# Patient Record
Sex: MALE | Race: OTHER
[De-identification: names, ages, dates, MRNs, and addresses within clinical notes are randomized per-mention and may not be internally consistent; named-entity substitution may affect disease eponyms.]

---

## 2023-03-18 ENCOUNTER — HOSPITAL ENCOUNTER (INPATIENT)
Dept: HOSPITAL 54 - ER | Age: 73
LOS: 4 days | Discharge: NURSING, REHAB OR LONG-TERM CARE | DRG: 291 | End: 2023-03-22
Attending: NURSE PRACTITIONER | Admitting: NURSE PRACTITIONER
Payer: MEDICARE

## 2023-03-18 VITALS — DIASTOLIC BLOOD PRESSURE: 70 MMHG | SYSTOLIC BLOOD PRESSURE: 110 MMHG

## 2023-03-18 VITALS — WEIGHT: 240 LBS | BODY MASS INDEX: 29.22 KG/M2 | HEIGHT: 76 IN

## 2023-03-18 DIAGNOSIS — L89.224: ICD-10-CM

## 2023-03-18 DIAGNOSIS — L89.610: ICD-10-CM

## 2023-03-18 DIAGNOSIS — D50.9: ICD-10-CM

## 2023-03-18 DIAGNOSIS — J96.01: ICD-10-CM

## 2023-03-18 DIAGNOSIS — R60.9: ICD-10-CM

## 2023-03-18 DIAGNOSIS — L89.126: ICD-10-CM

## 2023-03-18 DIAGNOSIS — L89.623: ICD-10-CM

## 2023-03-18 DIAGNOSIS — M16.12: ICD-10-CM

## 2023-03-18 DIAGNOSIS — Z88.8: ICD-10-CM

## 2023-03-18 DIAGNOSIS — D64.9: ICD-10-CM

## 2023-03-18 DIAGNOSIS — N40.0: ICD-10-CM

## 2023-03-18 DIAGNOSIS — M17.12: ICD-10-CM

## 2023-03-18 DIAGNOSIS — I48.92: ICD-10-CM

## 2023-03-18 DIAGNOSIS — I11.0: Primary | ICD-10-CM

## 2023-03-18 DIAGNOSIS — Z86.59: ICD-10-CM

## 2023-03-18 DIAGNOSIS — M24.561: ICD-10-CM

## 2023-03-18 DIAGNOSIS — Z79.01: ICD-10-CM

## 2023-03-18 DIAGNOSIS — Z91.199: ICD-10-CM

## 2023-03-18 DIAGNOSIS — L89.893: ICD-10-CM

## 2023-03-18 DIAGNOSIS — R53.2: ICD-10-CM

## 2023-03-18 DIAGNOSIS — E11.40: ICD-10-CM

## 2023-03-18 DIAGNOSIS — I50.23: ICD-10-CM

## 2023-03-18 DIAGNOSIS — M24.562: ICD-10-CM

## 2023-03-18 DIAGNOSIS — J90: ICD-10-CM

## 2023-03-18 DIAGNOSIS — I48.20: ICD-10-CM

## 2023-03-18 DIAGNOSIS — E44.0: ICD-10-CM

## 2023-03-18 DIAGNOSIS — Z20.822: ICD-10-CM

## 2023-03-18 DIAGNOSIS — I07.1: ICD-10-CM

## 2023-03-18 DIAGNOSIS — L89.154: ICD-10-CM

## 2023-03-18 DIAGNOSIS — L89.144: ICD-10-CM

## 2023-03-18 DIAGNOSIS — E88.09: ICD-10-CM

## 2023-03-18 DIAGNOSIS — E78.5: ICD-10-CM

## 2023-03-18 DIAGNOSIS — Z79.899: ICD-10-CM

## 2023-03-18 DIAGNOSIS — G89.29: ICD-10-CM

## 2023-03-18 LAB
ALBUMIN SERPL BCP-MCNC: 2.2 G/DL (ref 3.4–5)
ALP SERPL-CCNC: 117 U/L (ref 46–116)
ALT SERPL W P-5'-P-CCNC: 31 U/L (ref 12–78)
AST SERPL W P-5'-P-CCNC: 18 U/L (ref 15–37)
BASOPHILS # BLD AUTO: 0 K/UL (ref 0–0.2)
BASOPHILS NFR BLD AUTO: 0.3 % (ref 0–2)
BILIRUB DIRECT SERPL-MCNC: 0.2 MG/DL (ref 0–0.2)
BILIRUB SERPL-MCNC: 0.5 MG/DL (ref 0.2–1)
BUN SERPL-MCNC: 14 MG/DL (ref 7–18)
CALCIUM SERPL-MCNC: 8 MG/DL (ref 8.5–10.1)
CHLORIDE SERPL-SCNC: 107 MMOL/L (ref 98–107)
CO2 SERPL-SCNC: 31 MMOL/L (ref 21–32)
CREAT SERPL-MCNC: 0.7 MG/DL (ref 0.6–1.3)
EOSINOPHIL NFR BLD AUTO: 1.3 % (ref 0–6)
GLUCOSE SERPL-MCNC: 105 MG/DL (ref 74–106)
HCT VFR BLD AUTO: 35 % (ref 39–51)
HGB BLD-MCNC: 10.9 G/DL (ref 13.5–17.5)
LYMPHOCYTES NFR BLD AUTO: 1.1 K/UL (ref 0.8–4.8)
LYMPHOCYTES NFR BLD AUTO: 18.8 % (ref 20–44)
MCHC RBC AUTO-ENTMCNC: 31 G/DL (ref 31–36)
MCV RBC AUTO: 76 FL (ref 80–96)
MONOCYTES NFR BLD AUTO: 0.5 K/UL (ref 0.1–1.3)
MONOCYTES NFR BLD AUTO: 8.5 % (ref 2–12)
NEUTROPHILS # BLD AUTO: 4.3 K/UL (ref 1.8–8.9)
NEUTROPHILS NFR BLD AUTO: 71.1 % (ref 43–81)
PLATELET # BLD AUTO: 288 K/UL (ref 150–450)
POTASSIUM SERPL-SCNC: 4 MMOL/L (ref 3.5–5.1)
PROT SERPL-MCNC: 5.7 G/DL (ref 6.4–8.2)
RBC # BLD AUTO: 4.63 MIL/UL (ref 4.5–6)
SODIUM SERPL-SCNC: 143 MMOL/L (ref 136–145)
WBC NRBC COR # BLD AUTO: 6 K/UL (ref 4.3–11)

## 2023-03-18 PROCEDURE — A6253 ABSORPT DRG > 48 SQ IN W/O B: HCPCS

## 2023-03-18 PROCEDURE — A4223 INFUSION SUPPLIES W/O PUMP: HCPCS

## 2023-03-18 PROCEDURE — A4349 DISPOSABLE MALE EXTERNAL CAT: HCPCS

## 2023-03-18 PROCEDURE — G0378 HOSPITAL OBSERVATION PER HR: HCPCS

## 2023-03-18 PROCEDURE — C9803 HOPD COVID-19 SPEC COLLECT: HCPCS

## 2023-03-18 NOTE — NUR
INSERTED ANGO CATHETER G 18 ON RT FOR ARM BLOOD DROW AND SENT TO LAB AND BLOOD 
CULTURE X 2 SENT ANS LACTIC ACID SENT TO LAB

## 2023-03-18 NOTE — NUR
TELE RN ADMISSION NOTES:



RECEIVED PATIENT AWAKE IN BED, TRANSFER VIA GURNEY FROM ER, PATIENT WAS PLACED COMFORTABLY 
IN BED, BED IN LOW POSITION, CALL LIGHTS WITHIN REACH, NO COMPLAIN OF PAIN AND DISCOMFORT AT 
THIS TIME, ON O2 INHALATION AT 2LPM SATURATING AT 98-99%, PATIENT ON TELE MONITOR- SR-[id number] 
NO SYMPTOMS WAS OBSERVED, SKIN ASSESSMENT PARTIALLY DONE SOME PICTURES TAKEN AS PATIENT 
REFUSED TO BE TURNED AND REMOVAL OF BANDAGE ON LEFT FOOT, INVENTORY DONE AND SIGNED, PATIENT 
WAS ORIENTED TO ROOM REMIND TO USE THE CALL LIGHTS WHEN NEEDED ASSISTANCE, PATIENT KEPT 
CLEAN AND DRY ALL NEEDS MET WILL CONTINUE TO MONITOR

## 2023-03-19 VITALS — SYSTOLIC BLOOD PRESSURE: 124 MMHG | DIASTOLIC BLOOD PRESSURE: 69 MMHG

## 2023-03-19 VITALS — DIASTOLIC BLOOD PRESSURE: 65 MMHG | SYSTOLIC BLOOD PRESSURE: 99 MMHG

## 2023-03-19 VITALS — SYSTOLIC BLOOD PRESSURE: 115 MMHG | DIASTOLIC BLOOD PRESSURE: 89 MMHG

## 2023-03-19 VITALS — DIASTOLIC BLOOD PRESSURE: 69 MMHG | SYSTOLIC BLOOD PRESSURE: 110 MMHG

## 2023-03-19 VITALS — SYSTOLIC BLOOD PRESSURE: 108 MMHG | DIASTOLIC BLOOD PRESSURE: 59 MMHG

## 2023-03-19 VITALS — DIASTOLIC BLOOD PRESSURE: 64 MMHG | SYSTOLIC BLOOD PRESSURE: 124 MMHG

## 2023-03-19 VITALS — DIASTOLIC BLOOD PRESSURE: 66 MMHG | SYSTOLIC BLOOD PRESSURE: 120 MMHG

## 2023-03-19 LAB
BASOPHILS # BLD AUTO: 0 K/UL (ref 0–0.2)
BASOPHILS NFR BLD AUTO: 0.3 % (ref 0–2)
BUN SERPL-MCNC: 12 MG/DL (ref 7–18)
CALCIUM SERPL-MCNC: 8.3 MG/DL (ref 8.5–10.1)
CHLORIDE SERPL-SCNC: 102 MMOL/L (ref 98–107)
CHOLEST SERPL-MCNC: 106 MG/DL (ref ?–200)
CO2 SERPL-SCNC: 33 MMOL/L (ref 21–32)
CREAT SERPL-MCNC: 0.8 MG/DL (ref 0.6–1.3)
EOSINOPHIL NFR BLD AUTO: 1.1 % (ref 0–6)
GLUCOSE SERPL-MCNC: 113 MG/DL (ref 74–106)
HCT VFR BLD AUTO: 39 % (ref 39–51)
HDLC SERPL-MCNC: 47 MG/DL (ref 40–60)
HGB BLD-MCNC: 11.6 G/DL (ref 13.5–17.5)
IRON SERPL-MCNC: 23 UG/DL (ref 50–175)
LDLC SERPL DIRECT ASSAY-MCNC: 59 MG/DL (ref 0–99)
LYMPHOCYTES NFR BLD AUTO: 1 K/UL (ref 0.8–4.8)
LYMPHOCYTES NFR BLD AUTO: 19.4 % (ref 20–44)
MAGNESIUM SERPL-MCNC: 1.9 MG/DL (ref 1.8–2.4)
MCHC RBC AUTO-ENTMCNC: 30 G/DL (ref 31–36)
MCV RBC AUTO: 75 FL (ref 80–96)
MONOCYTES NFR BLD AUTO: 0.4 K/UL (ref 0.1–1.3)
MONOCYTES NFR BLD AUTO: 7.9 % (ref 2–12)
NEUTROPHILS # BLD AUTO: 3.6 K/UL (ref 1.8–8.9)
NEUTROPHILS NFR BLD AUTO: 71.3 % (ref 43–81)
PHOSPHATE SERPL-MCNC: 3.6 MG/DL (ref 2.5–4.9)
PLATELET # BLD AUTO: 304 K/UL (ref 150–450)
POTASSIUM SERPL-SCNC: 3.5 MMOL/L (ref 3.5–5.1)
RBC # BLD AUTO: 5.12 MIL/UL (ref 4.5–6)
SODIUM SERPL-SCNC: 140 MMOL/L (ref 136–145)
TIBC SERPL-MCNC: 276 UG/DL (ref 250–450)
TRIGL SERPL-MCNC: 61 MG/DL (ref 30–150)
TSH SERPL DL<=0.005 MIU/L-ACNC: 2.62 UIU/ML (ref 0.36–3.74)
WBC NRBC COR # BLD AUTO: 5.1 K/UL (ref 4.3–11)

## 2023-03-19 RX ADMIN — PANTOPRAZOLE SODIUM SCH MG: 40 TABLET, DELAYED RELEASE ORAL at 07:44

## 2023-03-19 RX ADMIN — Medication SCH TAB: at 08:19

## 2023-03-19 RX ADMIN — GABAPENTIN SCH MG: 100 CAPSULE ORAL at 12:29

## 2023-03-19 RX ADMIN — TAMSULOSIN HYDROCHLORIDE SCH MG: 0.4 CAPSULE ORAL at 08:20

## 2023-03-19 RX ADMIN — Medication SCH EACH: at 17:00

## 2023-03-19 RX ADMIN — APIXABAN SCH MG: 5 TABLET, FILM COATED ORAL at 16:08

## 2023-03-19 RX ADMIN — Medication PRN TAB: at 00:15

## 2023-03-19 RX ADMIN — OXYCODONE HYDROCHLORIDE AND ACETAMINOPHEN SCH MG: 500 TABLET ORAL at 08:17

## 2023-03-19 RX ADMIN — GABAPENTIN SCH MG: 100 CAPSULE ORAL at 16:08

## 2023-03-19 RX ADMIN — Medication SCH MG: at 08:19

## 2023-03-19 RX ADMIN — Medication PRN TAB: at 16:08

## 2023-03-19 RX ADMIN — DILTIAZEM HYDROCHLORIDE SCH MG: 120 CAPSULE, EXTENDED RELEASE ORAL at 08:19

## 2023-03-19 RX ADMIN — Medication SCH EACH: at 08:24

## 2023-03-19 RX ADMIN — GABAPENTIN SCH MG: 100 CAPSULE ORAL at 21:57

## 2023-03-19 RX ADMIN — LACTOBACILLUS TAB SCH EACH: TAB at 08:17

## 2023-03-19 RX ADMIN — GABAPENTIN SCH MG: 100 CAPSULE ORAL at 08:20

## 2023-03-19 RX ADMIN — Medication PRN TAB: at 04:26

## 2023-03-19 RX ADMIN — FERROUS SULFATE TAB 325 MG (65 MG ELEMENTAL FE) SCH MG: 325 (65 FE) TAB at 16:09

## 2023-03-19 RX ADMIN — APIXABAN SCH MG: 5 TABLET, FILM COATED ORAL at 08:23

## 2023-03-19 RX ADMIN — FERROUS SULFATE TAB 325 MG (65 MG ELEMENTAL FE) SCH MG: 325 (65 FE) TAB at 08:21

## 2023-03-19 RX ADMIN — SIMVASTATIN SCH MG: 20 TABLET, FILM COATED ORAL at 21:57

## 2023-03-19 RX ADMIN — METOPROLOL SUCCINATE SCH MG: 25 TABLET, EXTENDED RELEASE ORAL at 08:19

## 2023-03-19 RX ADMIN — Medication PRN TAB: at 09:44

## 2023-03-19 NOTE — NUR
TELE RN OPENING NOTES:



RECEIVED PATIENT AWAKE IN BED WITH HOB ELEVATED, PT IS AOX3-4, HARD OF HEARING, ABLE TO MAKE 
NEEDS KNOWN, FOLLOWS SIMPLE COMMANDS, ON ROOM AIR WITH NO DIFFICULTY BREATHING. OV ACCESS ON 
RFA G#18 SALINE LOCKED, PATENT AND FLUSHING WELL. ON TELEMONITORING WITH CURRENT READING OF 
AFIB WITH HR -120S, MD IS AWARE. PATIENT REPORTS PAIN IS CONTROLLED BUT FEELS IT WHEN 
MOVED, WILL CHECK AGAIN ONCE PAIN MEDICATION. PATIENT VERBALIZED REFUSAL TO CHANGE POSITION 
DESPITE ENCOURAGEMENT AND EDUCATION. TALKED ABOUT DIAPER CHANGE AS WELL, AND THE IMPORTANCE 
OF KEEPING DRY. WILL PLACE CONDOM CATHETER AS PATIENT IS REFUSING [place]. SAFETY MEASURES IN 
PLACE: BED IN LOWEST AND LOCKED POSITION, SIDE RAILS UP X2, BED ALARM ON, TRAY TABLE AND 
CALL LIGHT WITHIN EASY REACH. WILL CONTINUE TO MONITOR.

## 2023-03-19 NOTE — NUR
TELE RN CLOSING NOTES:



PATIENT AWAKE IN BED, BED IN LOW POSITION CALL LIGHTS WITHIN REACH, NO COMPLAIN OF PAIN AND 
DISCOMFORT AT THIS TIME, ON O2 INHALATION AT 2LPM SATURATING WELL, ON TELE MONITOR- AFIB-94, 
NO SYMPTOMS WAS OBSERVED, PATIENT IS ON BED REST WITH MULTIPLE SKIN ISSUES, REFUSE TO BE 
CHANGE, AND DOCUMENTED. PATIENT KEPT CLEAN AND DRY ALL NEEDS MET ENDORSE TO INCOMING SHIFT.

## 2023-03-19 NOTE — NUR
RN NOTES - PAIN MEDICATION | CONDOM CATH | PM CARE



PATIENT IS ANXIOUS ABOUT POSSIBLE PAIN DURING PERICARE AND BED CHANGE, GIVEN KETOROLAC 30 MG 
VIA IV AS ORDERED, CHANGED CONDOM CATH AND CHANGED REPLACED BEDSHEETS, PATIENT WAS ABLE TO 
TOLERATE TURNING AND REPOSITIONING

## 2023-03-19 NOTE — NUR
RN NOTES - PATIENT COMPLAINING OF 7/10 LEFT HIP PAIN AGAIN, GIVEN NORCO 5/325 MG, WILL 
CONTINUE TO MONITOR.

## 2023-03-19 NOTE — NUR
TELE RN OPENING NOTES



RECEIVED PT AWAKE IN BED WITH HOB ELEVATED. AOX3-4, [place], ABLE TO MAKE NEEDS KNOWN. ON 2LPM 
VIA NC WITH NO S/S OF SOB OR DISTRESS. IV ACCESS RFA #18G SL, PATENT AND FLUSHING WELL. ON 
TELE MONITORING WITH CURRENT READING OF CONTROLLED AFIB WITH HR . ON CONDOM CATHETER 
INTACT, PALE YELLOW URINE NOTED IN BAG. SAFETY MEASURES IN PLACE: BED IN LOWEST AND LOCKED 
POSITION, SIDE RAILS UP X3, BED ALARM ON, TRAY TABLE AND CALL LIGHT WITHIN EASY REACH. WILL 
CONTINUE TO MONITOR AND ASSIST.

## 2023-03-19 NOTE — NUR
RN NOTE



PT ENCOURAGED TO TAKE ALEJANDRINA PACKET BUT REFUSES. PT STATES "I WANT TO LOOK UP THE INGREDIENTS 
FIRST". EXPLAINED RISK/BENEFITS, VERBALIZED UNDERSTANDING.

## 2023-03-19 NOTE — NUR
TELE RN CLOSING NOTES



PATIENT AWAKE IN BED WITH HOB ELEVATED, PT IS AOX3-4, HARD OF HEARING,  ON 2LPM VIA NC 
WITHOUT DIFFICULTY. OV ACCESS ON RFA G#18 SALINE LOCKED, PATENT AND FLUSHING WELL. ON 
TELEMONITORING WITH CURRENT READING OF CONTROLLED AFIB WITH HR OF 80S, PAIN IS CONTROLLED AT 
THE MOMENT, SAME IV ACCESS, PATENT, FLUSHING WELL. ON CONDOM CATHETER DRAINING PALE YELLOW 
URINE OF ABOUT 200 CC. ALL DUE MEDS GIVEN, KEPT SAFE AND COMFORTABLE. SAFETY MEASURES 
MAINTAINED: BED IN LOWEST AND LOCKED POSITION, SIDE RAILS UP X2, BED ALARM ON, TRAY TABLE 
AND CALL LIGHT WITHIN EASY REACH. ENDORSED TO NIGHT SHIFT NURSE.

## 2023-03-19 NOTE — NUR
RN NOTES - CALLED KITCHEN TO REMIND ALEJANDRINA AT 1600 BUT NO ALEJANDRINA UP UNTIL THIS TIME STOCKED, 
WILL ENDORSE TO NIGHT SHIFT NURSE.

## 2023-03-19 NOTE — NUR
RN NOTES - PATIENT COMPLAINING OF 7/10 LEFT HIP PAIN, GIVEN NORCO 5/325 MG, WILL CONTINUE TO 
MONITOR.

## 2023-03-19 NOTE — NUR
RN NOTES:





ATTEMPT TO CHANGE DRESSING AND POSSIBLE ASSESSMENT OF SKIN AT THE BACK PART AND SACRAL WHILE 
CHANGING THE BD SHEET AND DIAPER, PATIENT WAS SCREAMING AND BECOMING COMBATIVE,  SKIN 
ASSESSMENT WAS NOT DONE AND DOCUMENTED, PATIENT WAS PREMEDICATED WITH PAIN MEDICINE NORCO 
5-325 PRIOR TO CHANGING,  WILL CONTINUE TO MONITOR

## 2023-03-20 VITALS — DIASTOLIC BLOOD PRESSURE: 85 MMHG | SYSTOLIC BLOOD PRESSURE: 121 MMHG

## 2023-03-20 VITALS — DIASTOLIC BLOOD PRESSURE: 72 MMHG | SYSTOLIC BLOOD PRESSURE: 125 MMHG

## 2023-03-20 VITALS — DIASTOLIC BLOOD PRESSURE: 66 MMHG | SYSTOLIC BLOOD PRESSURE: 120 MMHG

## 2023-03-20 LAB
BASE EXCESS BLDA CALC-SCNC: 3.4 MMOL/L
BASOPHILS # BLD AUTO: 0 K/UL (ref 0–0.2)
BASOPHILS NFR BLD AUTO: 0.4 % (ref 0–2)
BUN SERPL-MCNC: 12 MG/DL (ref 7–18)
CALCIUM SERPL-MCNC: 8 MG/DL (ref 8.5–10.1)
CHLORIDE SERPL-SCNC: 103 MMOL/L (ref 98–107)
CO2 SERPL-SCNC: 33 MMOL/L (ref 21–32)
CREAT SERPL-MCNC: 0.8 MG/DL (ref 0.6–1.3)
DO-HGB MFR BLDA: 45.9 MMHG
EOSINOPHIL NFR BLD AUTO: 2.1 % (ref 0–6)
GLUCOSE SERPL-MCNC: 97 MG/DL (ref 74–106)
HCT VFR BLD AUTO: 32 % (ref 39–51)
HGB BLD-MCNC: 10 G/DL (ref 13.5–17.5)
INHALED O2 CONCENTRATION: 28 %
LYMPHOCYTES NFR BLD AUTO: 0.9 K/UL (ref 0.8–4.8)
LYMPHOCYTES NFR BLD AUTO: 19 % (ref 20–44)
MAGNESIUM SERPL-MCNC: 1.8 MG/DL (ref 1.8–2.4)
MCHC RBC AUTO-ENTMCNC: 31 G/DL (ref 31–36)
MCV RBC AUTO: 75 FL (ref 80–96)
MONOCYTES NFR BLD AUTO: 0.5 K/UL (ref 0.1–1.3)
MONOCYTES NFR BLD AUTO: 11.7 % (ref 2–12)
NEUTROPHILS # BLD AUTO: 3.1 K/UL (ref 1.8–8.9)
NEUTROPHILS NFR BLD AUTO: 66.8 % (ref 43–81)
PCO2 TEMP ADJ BLDA: 40 MMHG (ref 35–45)
PH TEMP ADJ BLDA: 7.46 [PH] (ref 7.35–7.45)
PHOSPHATE SERPL-MCNC: 3.8 MG/DL (ref 2.5–4.9)
PLATELET # BLD AUTO: 267 K/UL (ref 150–450)
PO2 TEMP ADJ BLDA: 106.5 MMHG (ref 75–100)
POTASSIUM SERPL-SCNC: 3.8 MMOL/L (ref 3.5–5.1)
RBC # BLD AUTO: 4.29 MIL/UL (ref 4.5–6)
SAO2 % BLDA: 97.8 % (ref 92–98.5)
SODIUM SERPL-SCNC: 140 MMOL/L (ref 136–145)
VENTILATION MODE VENT: (no result)
WBC NRBC COR # BLD AUTO: 4.7 K/UL (ref 4.3–11)

## 2023-03-20 RX ADMIN — GABAPENTIN SCH MG: 100 CAPSULE ORAL at 21:26

## 2023-03-20 RX ADMIN — SIMVASTATIN SCH MG: 20 TABLET, FILM COATED ORAL at 21:28

## 2023-03-20 RX ADMIN — APIXABAN SCH MG: 5 TABLET, FILM COATED ORAL at 08:30

## 2023-03-20 RX ADMIN — GABAPENTIN SCH MG: 100 CAPSULE ORAL at 12:42

## 2023-03-20 RX ADMIN — Medication SCH GM: at 08:28

## 2023-03-20 RX ADMIN — PANTOPRAZOLE SODIUM SCH MG: 40 TABLET, DELAYED RELEASE ORAL at 08:26

## 2023-03-20 RX ADMIN — MELOXICAM SCH MG: 7.5 TABLET ORAL at 14:07

## 2023-03-20 RX ADMIN — Medication SCH EACH: at 08:52

## 2023-03-20 RX ADMIN — GABAPENTIN SCH MG: 100 CAPSULE ORAL at 16:04

## 2023-03-20 RX ADMIN — Medication SCH OZ: at 19:01

## 2023-03-20 RX ADMIN — FERROUS SULFATE TAB 325 MG (65 MG ELEMENTAL FE) SCH MG: 325 (65 FE) TAB at 08:27

## 2023-03-20 RX ADMIN — Medication SCH MG: at 08:27

## 2023-03-20 RX ADMIN — LACTOBACILLUS TAB SCH EACH: TAB at 08:26

## 2023-03-20 RX ADMIN — GABAPENTIN SCH MG: 100 CAPSULE ORAL at 08:27

## 2023-03-20 RX ADMIN — OXYCODONE HYDROCHLORIDE AND ACETAMINOPHEN SCH MG: 500 TABLET ORAL at 08:28

## 2023-03-20 RX ADMIN — Medication SCH TAB: at 08:26

## 2023-03-20 RX ADMIN — FUROSEMIDE SCH MG: 40 TABLET ORAL at 08:27

## 2023-03-20 RX ADMIN — TAMSULOSIN HYDROCHLORIDE SCH MG: 0.4 CAPSULE ORAL at 08:27

## 2023-03-20 RX ADMIN — APIXABAN SCH MG: 5 TABLET, FILM COATED ORAL at 16:06

## 2023-03-20 RX ADMIN — METOPROLOL SUCCINATE SCH MG: 25 TABLET, EXTENDED RELEASE ORAL at 08:29

## 2023-03-20 RX ADMIN — FERROUS SULFATE TAB 325 MG (65 MG ELEMENTAL FE) SCH MG: 325 (65 FE) TAB at 16:04

## 2023-03-20 RX ADMIN — DILTIAZEM HYDROCHLORIDE SCH MG: 120 CAPSULE, EXTENDED RELEASE ORAL at 08:29

## 2023-03-20 RX ADMIN — Medication SCH EACH: at 16:04

## 2023-03-20 NOTE — NUR
TELE RN CLOSING NOTES



PT SLEEPING IN BED, BUT AROUSABLE AND ABLE TO MAKE NEEDS KNOWN. [employer] [place], EITHER YOU SHOUT OR 
WRITE YOUR CONCERNS ON PAPER. STABLE ON 2LPM VIA NC WITH NO S/S OF SOB OR DISTRESS. IV 
ACCESS RFA #18G SL, FLUSHING HOWEVER WITH VISIBLE BLOOD INSIDE AND OUT OF THE TRANSPARENT 
TAPE. INFORMED CLIENT THAT DRESSING IS NEEDED TO BE CHANGED AND ESTABLISH NEW IV LINE IF 
WARRANTED. ON TELE MONITORING WITH CURRENT READING OF CONTROLLED AFIB, 84 HR. ON CONDOM 
CATHETER INTACT, DRAINING CLEAR YELLOW URINE. SAFETY MEASURES MAINTAINED: BED IN LOWEST AND 
LOCKED POSITION, SIDE RAILS UP X3, BED ALARM ON, TRAY TABLE AND CALL LIGHT WITHIN EASY 
REACH. WILL CONTINUE TO MONITOR THE PATIENT

-------------------------------------------------------------------------------

Addendum: 03/20/23 at 1142 by JARRED MADRID JR, RN

-------------------------------------------------------------------------------

OPENING NOTES

## 2023-03-20 NOTE — NUR
WOUND CARE CONSULT: PT REFUSED TO TURN FOR SKIN ASSESSMENT. ADMISSION PHOTOS INDICATE 
MULTIPLE PRESSURE ULCERS PRESENT ON ADMISSION INCLUDING LEFT HIP, SACRUM, LEFT FLANK, LEFT 
UPPER BACK AND SHOULDER DEEP TISSUE INJURIES AND FOOT WOUNDS, ALL PRESENT ON ADMISSION. DR GOOD AND DR VEGA CALLED FOR SURGICAL AND DPM CONSULTS. DISCUSSED SKIN PROTECTION 
WITH NURSING STAFF. [facility] MD IN AGREEMENT WITH PLAN OF 
CARE.

## 2023-03-20 NOTE — NUR
Patient didn't sign yet the wound debridement form, verbalizes "It's still premature to 
sign", according to the patient

## 2023-03-20 NOTE — NUR
TELE RN CLOSING NOTES



PT SLEEPING IN BED, HOB SEMI-FOWLERS. AOX3-4, [place], ABLE TO MAKE NEEDS KNOWN. STABLE ON 2LPM 
VIA NC WITH NO S/S OF SOB OR DISTRESS. IV ACCESS RFA #18G SL, PATENT AND FLUSHING WELL. ON 
TELE MONITORING WITH CURRENT READING OF CONTROLLED AFIB, 82 HR. ON CONDOM CATHETER INTACT, 
DRAINING CLEAR YELLOW URINE, TOTAL  ML OUTPUT. ALL CARE PROVIDED AND MEDS TOLERATED 
WELL. REFUSED TO BE CLEANED, REPOSITIONED, OR CHANGE SHEETS. SAFETY MEASURES MAINTAINED: BED 
IN LOWEST AND LOCKED POSITION, SIDE RAILS UP X3, BED ALARM ON, TRAY TABLE AND CALL LIGHT 
WITHIN EASY REACH. WILL ENDORSE MARNIE TO DAY SHIFT NURSE.

## 2023-03-20 NOTE — NUR
TELE RN CLOSING NOTES



PATIENT AWAKE IN BED WITH HOB ELEVATED, PT IS AOX3, HARD OF HEARING,  ON 2LPM VIA NC WITHOUT 
DIFFICULTY. OV ACCESS ON RFA G#18 SALINE LOCKED, PATENT AND FLUSHING WELL. ON TELEMONITORING 
WITH CURRENT READING OF CONTROLLED AFIB WITH HR OF 82, PAIN IS CONTROLLED AT THE MOMENT. ON 
CONDOM CATHETER DRAINING PALE YELLOW URINE  CC. ALL DUE MEDS GIVEN, KEPT SAFE AND 
COMFORTABLE. SAFETY MEASURES MAINTAINED: BED IN LOWEST AND LOCKED POSITION, SIDE RAILS UP 
X2, BED ALARM ON, TRAY TABLE AND CALL LIGHT WITHIN EASY REACH. WILL BE ENDORSED TO NIGHT 
SHIFT NURSE FOR MARNIE.

## 2023-03-20 NOTE — NUR
RN TELE OPENING NOTES



RECEIVED PATIENT IN BED, ON RIGHT LYING POSITION AND MODERATE HIGH BACK REST POSITION. NO 
S/S OF PAIN OR ANY DISCOMFORT AT THIS TIME. NOTED MULTIPLE PRESSURE ULCERS PATIENT REFUSED 
TO DO SKIN ASSESSMENT AT THIS TIME. WITH IV ACCESS AT RFA #18 SL PATENT AND INTACT.PATIENT S 
INCONTINENT USES DIAPER AND ON CONDOM CATHETER CONNECTED TO URINE BAG NOTED URINE OUTPUT. ON 
TELE MONITORING DEVICE WITH A-FIB READING. PATIENT IS FOR WOUND DEBRIDEMENT BUT PATIENT DID 
NOT SIGNED A CONSENT. KEPT BED ON LOWE LOCKED POSITION. KEPT SIDE RAILS UP X 2 ALL THE TIME. 
KEPT CALL LIGHT LIGHT WITHIN AT REACH. WILL CONTINUE TO MONITOR.

## 2023-03-21 VITALS — DIASTOLIC BLOOD PRESSURE: 75 MMHG | SYSTOLIC BLOOD PRESSURE: 107 MMHG

## 2023-03-21 VITALS — SYSTOLIC BLOOD PRESSURE: 83 MMHG | DIASTOLIC BLOOD PRESSURE: 55 MMHG

## 2023-03-21 VITALS — DIASTOLIC BLOOD PRESSURE: 69 MMHG | SYSTOLIC BLOOD PRESSURE: 100 MMHG

## 2023-03-21 VITALS — DIASTOLIC BLOOD PRESSURE: 76 MMHG | SYSTOLIC BLOOD PRESSURE: 103 MMHG

## 2023-03-21 VITALS — DIASTOLIC BLOOD PRESSURE: 67 MMHG | SYSTOLIC BLOOD PRESSURE: 94 MMHG

## 2023-03-21 LAB
BASOPHILS # BLD AUTO: 0 K/UL (ref 0–0.2)
BASOPHILS NFR BLD AUTO: 0.3 % (ref 0–2)
BUN SERPL-MCNC: 18 MG/DL (ref 7–18)
CALCIUM SERPL-MCNC: 7.8 MG/DL (ref 8.5–10.1)
CHLORIDE SERPL-SCNC: 102 MMOL/L (ref 98–107)
CO2 SERPL-SCNC: 30 MMOL/L (ref 21–32)
CREAT SERPL-MCNC: 0.8 MG/DL (ref 0.6–1.3)
EOSINOPHIL NFR BLD AUTO: 1.4 % (ref 0–6)
GLUCOSE SERPL-MCNC: 137 MG/DL (ref 74–106)
HCT VFR BLD AUTO: 33 % (ref 39–51)
HGB BLD-MCNC: 10.3 G/DL (ref 13.5–17.5)
LYMPHOCYTES NFR BLD AUTO: 0.9 K/UL (ref 0.8–4.8)
LYMPHOCYTES NFR BLD AUTO: 14.8 % (ref 20–44)
MAGNESIUM SERPL-MCNC: 1.8 MG/DL (ref 1.8–2.4)
MCHC RBC AUTO-ENTMCNC: 31 G/DL (ref 31–36)
MCV RBC AUTO: 76 FL (ref 80–96)
MONOCYTES NFR BLD AUTO: 0.5 K/UL (ref 0.1–1.3)
MONOCYTES NFR BLD AUTO: 7.6 % (ref 2–12)
NEUTROPHILS # BLD AUTO: 4.6 K/UL (ref 1.8–8.9)
NEUTROPHILS NFR BLD AUTO: 75.9 % (ref 43–81)
PHOSPHATE SERPL-MCNC: 2.9 MG/DL (ref 2.5–4.9)
PLATELET # BLD AUTO: 294 K/UL (ref 150–450)
POTASSIUM SERPL-SCNC: 3.6 MMOL/L (ref 3.5–5.1)
RBC # BLD AUTO: 4.37 MIL/UL (ref 4.5–6)
SODIUM SERPL-SCNC: 138 MMOL/L (ref 136–145)
WBC NRBC COR # BLD AUTO: 6.1 K/UL (ref 4.3–11)

## 2023-03-21 RX ADMIN — FUROSEMIDE SCH MG: 40 TABLET ORAL at 08:43

## 2023-03-21 RX ADMIN — Medication PRN TAB: at 05:11

## 2023-03-21 RX ADMIN — GABAPENTIN SCH MG: 100 CAPSULE ORAL at 16:46

## 2023-03-21 RX ADMIN — OXYCODONE HYDROCHLORIDE AND ACETAMINOPHEN SCH MG: 500 TABLET ORAL at 08:41

## 2023-03-21 RX ADMIN — Medication SCH TAB: at 08:42

## 2023-03-21 RX ADMIN — FERROUS SULFATE TAB 325 MG (65 MG ELEMENTAL FE) SCH MG: 325 (65 FE) TAB at 16:47

## 2023-03-21 RX ADMIN — GABAPENTIN SCH MG: 100 CAPSULE ORAL at 21:07

## 2023-03-21 RX ADMIN — FERROUS SULFATE TAB 325 MG (65 MG ELEMENTAL FE) SCH MG: 325 (65 FE) TAB at 08:41

## 2023-03-21 RX ADMIN — TAMSULOSIN HYDROCHLORIDE SCH MG: 0.4 CAPSULE ORAL at 08:42

## 2023-03-21 RX ADMIN — Medication SCH EACH: at 16:47

## 2023-03-21 RX ADMIN — Medication SCH EACH: at 08:44

## 2023-03-21 RX ADMIN — APIXABAN SCH MG: 5 TABLET, FILM COATED ORAL at 08:43

## 2023-03-21 RX ADMIN — DILTIAZEM HYDROCHLORIDE SCH MG: 120 CAPSULE, EXTENDED RELEASE ORAL at 08:42

## 2023-03-21 RX ADMIN — GABAPENTIN SCH MG: 100 CAPSULE ORAL at 12:29

## 2023-03-21 RX ADMIN — GABAPENTIN SCH MG: 100 CAPSULE ORAL at 08:42

## 2023-03-21 RX ADMIN — SIMVASTATIN SCH MG: 20 TABLET, FILM COATED ORAL at 21:07

## 2023-03-21 RX ADMIN — Medication SCH GM: at 09:30

## 2023-03-21 RX ADMIN — APIXABAN SCH MG: 5 TABLET, FILM COATED ORAL at 16:48

## 2023-03-21 RX ADMIN — Medication SCH OZ: at 09:30

## 2023-03-21 RX ADMIN — PANTOPRAZOLE SODIUM SCH MG: 40 TABLET, DELAYED RELEASE ORAL at 08:41

## 2023-03-21 RX ADMIN — LACTOBACILLUS TAB SCH EACH: TAB at 08:42

## 2023-03-21 RX ADMIN — Medication SCH MG: at 08:41

## 2023-03-21 RX ADMIN — METOPROLOL SUCCINATE SCH MG: 25 TABLET, EXTENDED RELEASE ORAL at 08:42

## 2023-03-21 RX ADMIN — MELOXICAM SCH MG: 7.5 TABLET ORAL at 08:42

## 2023-03-21 NOTE — NUR
RN TELE CLOSING NOTES



PATIENT IS IN BED, ASLEEP. A/O X 2 WITH EPISODES OF CONFUSIONS ON MODERATE HIGH BACK REST 
POSITION. HOOKED TO NASAL CANNULA AT 2LPM SATURATING WELL. ATTACHED TO TELE MONITORING 
DEVICE WITH EPISODES OF A-FIB. PATIENT IS INCONTINENT AND USES DIAPER. WITH CONDOM CATHETER 
IN PLACED CONNECTED TO URINE BAG NOTED URINE OUTPUT. WITH IV ACCESS AT RFA #18G SL PATENT 
AND INTACT.PM CARE RENDERED WOUND CARE DONE. PATIENT ABLE TO MOVE SLOWLY WHEN TURNING.  ALL 
DUE MEDICATIONS GIVEN ALL NEEDS ATTENDED. TURNED PATIENT EVERY 2 HOURS. KEPT BAD ON LOWER 
LOCKED POSITION KEPT SIDE RAILS UP X 3 ALL THE TIME. KEPT CALL LIGHT WITHIN AT REACH. WILL 
ENDORSED TO AM SHIFT FOR MARNIE.

## 2023-03-21 NOTE — NUR
RN TELE NOTES

PT REPOSITIONED BUT DOES NOT LIKE IT, PREFERS TO BE ON HIS RIGHT SIDE, EXPLAINED RISKS AND 
BENEFITS OF REPOSITIONING, STILL REFUSED.

## 2023-03-21 NOTE — NUR
TELE RN OPENING NOTES



RECEIVED PATIENT IN BED, ON RIGHT LYING POSITION AND MODERATE HIGH BACK REST POSITION. NO 
S/S OF PAIN OR ANY DISCOMFORT AT THIS TIME. NOTED MULTIPLE PRESSURE ULCERS. IV ACCESS AT RFA 
#18 SL PATENT AND INTACT.PATIENT IS INCONTINENT USES DIAPER AND ON CONDOM CATHETER CONNECTED 
TO URINE BAG NOTED URINE OUTPUT. ON TELE MONITORING DEVICE WITH A-FIB READING. WILL MAINTAIN 
SAFETY MEASURES WITH BED ON LOW LOCKED POSITION. SIDE RAILS UP X 2. CALL LIGHT AND TABLE 
WITHIN AT REACH. WILL  CONTINUE TO MONITOR AND REASSESS FOR ANY CHANGES. WILL CARRY OUT ANY 
ONGOING AND ACTIVE MD ORDERS.

## 2023-03-21 NOTE — NUR
RN TELE NOTES

PT IN BED, AWAKE, ALERT AND ORIENTED, NO COMPLAINT OF PAIN AT THIS TIME, RESPIRATIONS 
NORMAL, CALL LIGHT WITHIN REACH, NO SHORTNESS OF BREATH, KEPT COMFORTABLE IN BED.

## 2023-03-21 NOTE — NUR
RN TELE NOTES

PT IN BED, AWAKE, ALERT AND ORIENTED, DENIES PAIN, NOT IN DISTRESS, PM MEDS GIVEN AS 
ORDERED, PM CARE PROVIDED, WOUND DRESSING DRY AND INTACT, PT REFUSED WOUND TREATMENT AND 
DRESSING CHANGE, PT SEEN BY DR. CORMIER, REFUSED WOUND EXAM AND DEBRIDEMENT, CONDOM CATH IN 
PLACE, DRAINING WELL WITH CLEAR, YELLOW URINE, REPOSITIONED FOR COMFORT.

## 2023-03-21 NOTE — NUR
RN NOTES- REFUSED REPOSITION



DUE MEDS GIVEN TO THE PATIENT. REFUSED REPOSITION. WILL MONITOR THE PATIENT AND TRY TO 
REPOSTION THE PATIENT AFTER 30 MINS.

## 2023-03-22 VITALS — DIASTOLIC BLOOD PRESSURE: 76 MMHG | SYSTOLIC BLOOD PRESSURE: 105 MMHG

## 2023-03-22 VITALS — DIASTOLIC BLOOD PRESSURE: 82 MMHG | SYSTOLIC BLOOD PRESSURE: 110 MMHG

## 2023-03-22 VITALS — SYSTOLIC BLOOD PRESSURE: 135 MMHG | DIASTOLIC BLOOD PRESSURE: 77 MMHG

## 2023-03-22 VITALS — SYSTOLIC BLOOD PRESSURE: 122 MMHG | DIASTOLIC BLOOD PRESSURE: 68 MMHG

## 2023-03-22 LAB
BASOPHILS # BLD AUTO: 0 K/UL (ref 0–0.2)
BASOPHILS NFR BLD AUTO: 0.9 % (ref 0–2)
BUN SERPL-MCNC: 18 MG/DL (ref 7–18)
CALCIUM SERPL-MCNC: 7.8 MG/DL (ref 8.5–10.1)
CHLORIDE SERPL-SCNC: 104 MMOL/L (ref 98–107)
CO2 SERPL-SCNC: 32 MMOL/L (ref 21–32)
CREAT SERPL-MCNC: 0.8 MG/DL (ref 0.6–1.3)
EOSINOPHIL NFR BLD AUTO: 2.3 % (ref 0–6)
GLUCOSE SERPL-MCNC: 100 MG/DL (ref 74–106)
HCT VFR BLD AUTO: 31 % (ref 39–51)
HGB BLD-MCNC: 9.8 G/DL (ref 13.5–17.5)
LYMPHOCYTES NFR BLD AUTO: 0.9 K/UL (ref 0.8–4.8)
LYMPHOCYTES NFR BLD AUTO: 16.8 % (ref 20–44)
MAGNESIUM SERPL-MCNC: 1.9 MG/DL (ref 1.8–2.4)
MCHC RBC AUTO-ENTMCNC: 31 G/DL (ref 31–36)
MCV RBC AUTO: 75 FL (ref 80–96)
MONOCYTES NFR BLD AUTO: 0.5 K/UL (ref 0.1–1.3)
MONOCYTES NFR BLD AUTO: 10 % (ref 2–12)
NEUTROPHILS # BLD AUTO: 3.7 K/UL (ref 1.8–8.9)
NEUTROPHILS NFR BLD AUTO: 70 % (ref 43–81)
PHOSPHATE SERPL-MCNC: 3 MG/DL (ref 2.5–4.9)
PLATELET # BLD AUTO: 265 K/UL (ref 150–450)
POTASSIUM SERPL-SCNC: 3.9 MMOL/L (ref 3.5–5.1)
RBC # BLD AUTO: 4.18 MIL/UL (ref 4.5–6)
SODIUM SERPL-SCNC: 139 MMOL/L (ref 136–145)
WBC NRBC COR # BLD AUTO: 5.3 K/UL (ref 4.3–11)

## 2023-03-22 RX ADMIN — METOPROLOL SUCCINATE SCH MG: 25 TABLET, EXTENDED RELEASE ORAL at 08:51

## 2023-03-22 RX ADMIN — APIXABAN SCH MG: 5 TABLET, FILM COATED ORAL at 08:54

## 2023-03-22 RX ADMIN — FUROSEMIDE SCH MG: 40 TABLET ORAL at 08:49

## 2023-03-22 RX ADMIN — GABAPENTIN SCH MG: 100 CAPSULE ORAL at 12:33

## 2023-03-22 RX ADMIN — PANTOPRAZOLE SODIUM SCH MG: 40 TABLET, DELAYED RELEASE ORAL at 08:20

## 2023-03-22 RX ADMIN — FERROUS SULFATE TAB 325 MG (65 MG ELEMENTAL FE) SCH MG: 325 (65 FE) TAB at 08:49

## 2023-03-22 RX ADMIN — Medication SCH MG: at 08:49

## 2023-03-22 RX ADMIN — Medication SCH TAB: at 08:50

## 2023-03-22 RX ADMIN — GABAPENTIN SCH MG: 100 CAPSULE ORAL at 08:49

## 2023-03-22 RX ADMIN — OXYCODONE HYDROCHLORIDE AND ACETAMINOPHEN SCH MG: 500 TABLET ORAL at 08:51

## 2023-03-22 RX ADMIN — MELOXICAM SCH MG: 7.5 TABLET ORAL at 08:50

## 2023-03-22 RX ADMIN — LACTOBACILLUS TAB SCH EACH: TAB at 08:50

## 2023-03-22 RX ADMIN — DILTIAZEM HYDROCHLORIDE SCH MG: 120 CAPSULE, EXTENDED RELEASE ORAL at 08:52

## 2023-03-22 RX ADMIN — TAMSULOSIN HYDROCHLORIDE SCH MG: 0.4 CAPSULE ORAL at 08:49

## 2023-03-22 RX ADMIN — Medication SCH APPLIC: at 08:54

## 2023-03-22 RX ADMIN — Medication SCH EACH: at 08:52

## 2023-03-22 RX ADMIN — Medication SCH APPLIC: at 08:55

## 2023-03-22 NOTE — NUR
TELE RN OPENING NOTE



RECEIVED PT AWAKE AND RESTING IN BED. PT IS A/O X2-3, ABLE TO MAKE NEEDS KNOWN. PT ON O2 AT 
2L/MIN VIA NASAL CANNULA, TOLERATING WELL. NO SOB NOTED. NOT IN ANY SIGN OF RESPIRATORY 
DISTRESS. PT ON TELE CARDIAC MONITOR WITH CURRENT READING SINUS TACH, . NO C/O CARDIAC 
DISTRESS VOICED OUT AT THIS TIME. DR. BACON AWARE OF PT'S CURRENT READING. SAFETY MEASURES 
IN PLACE: BED IN LOWEST AND LOCKED POSITION, KEPT HOB ELEVATED, SIDE RAILS UPX2, AND CALL 
LIGHT WITHIN REACH. WILL CONTINUE PT WITH PLAN OF CARE.

## 2023-03-22 NOTE — NUR
RN NOTES- WOUND CARE AND DRESSING CHANGE DONE



PATIENT WAS ABLE TO TOLERATE WOUND CARE AND DRESSING CHANGE THIS TIME. LINEN CHANGED AND 
CONDOM CATHETER CHANGED WITH THE HELP OF ANGELO. WILL CONTINUE TO MONITOR THE PATIENT.

## 2023-03-22 NOTE — NUR
DISCHARGE RN NOTE



PT DISCHARGED BACK TO [facility] FOR CONTINUITY OF CARE. PT IS A/O X2-3, 
ABLE TO MAKE NEEDS KNOWN. PT IS ON O2 AT 2L/MIN VIA NASAL CANNULA, TOLERATING WELL WITH SPO2 
AT 96%. NO SOB NOTED. NOT IN ANY SIGN OF RESPIRATORY DISTRESS. VITAL SIGNS TAKEN, STABLE, 
AND RECORDED. PT REFUSED BODY ASSESSMENTS AND PHOTOGRAPHS OF SKIN ISSUES. PT'S TELE CARDIAC 
MONITOR REMOVED AND TELE BOX WAS GIVEN TO THE MONITOR CAROLE DOWNING. TELE CARDIAC READING PRIOR 
TO REMOVING THE MONITOR WAS SINUS TACH, . NO C/O CARDIAC DISTRESS VOICED OUT AT THIS 
TIME. DR. BACON AND DR. KLAUDIA CARDONA AWARE OF PT'S CURRENT CARDIAC READING. ALL 
BELONGINGS ACCOUNTED FOR. DISCHARGED INSTRUCTIONS AND HEALTH TEACHINGS EXPLAINED TO THE PT 
AND PT VERBALIZED UNDERSTANDING. IV ACCESS ON RFA G#18 REMOVED AND NO ACTIVE BLEEDING NOTED. 
DRY PRESSURE DRESSING APPLIED AT THE SITE. WRISTBAND REMOVED. REPORT GIVEN EARLIER TO EVITA HARO OF [facility]. PT LEFT THE UNIT AT 1458 ACCOMPANIED BY 2 EMTS VIA 
JAMES AGUERO AND CHARGED NURSE AWARE OF DISCHARGED.

## 2023-03-22 NOTE — NUR
TELE RN OPENING NOTES



PATIENT IN BED, ON RIGHT LYING POSITION AND MODERATE HIGH BACK REST POSITION. PATIENT 
REFUSED TO BE REPOSITIONED. NO S/S OF PAIN OR ANY DISCOMFORT AT THIS TIME. NOTED MULTIPLE 
PRESSURE ULCERS. WOUND CARE AND DRESSING CHANGE DONE. IV ACCESS AT RFA #18 SL PATENT AND 
INTACT. PATIENT IS INCONTINENT USES CONDOM CATHETER CONNECTED TO URINE BAG WITH A URINE 
OUTPUT OF 700cc. ON TELE MONITORING DEVICE WITH A-FIB @ 118. SAFETY MEASURES MAINTAINED WITH 
BED ON LOW LOCKED POSITION. SIDE RAILS UP X 2. CALL LIGHT AND TABLE WITHIN AT REACH. WILL 
ENDORSE TO THE NEXT SHIFT FOR CONTINUITY OF CARE.

-------------------------------------------------------------------------------

Addendum: 03/22/23 at 0655 by MERRICK MELLO RN

-------------------------------------------------------------------------------

TELE RN CLOSING NOTES

## 2023-09-16 ENCOUNTER — HOSPITAL ENCOUNTER (INPATIENT)
Dept: HOSPITAL 54 - ER | Age: 73
LOS: 4 days | Discharge: NURSING, REHAB OR LONG-TERM CARE | DRG: 693 | End: 2023-09-20
Attending: NURSE PRACTITIONER | Admitting: INTERNAL MEDICINE
Payer: MEDICARE

## 2023-09-16 VITALS — WEIGHT: 203 LBS | BODY MASS INDEX: 24.72 KG/M2 | HEIGHT: 76 IN

## 2023-09-16 DIAGNOSIS — R73.03: ICD-10-CM

## 2023-09-16 DIAGNOSIS — F03.94: ICD-10-CM

## 2023-09-16 DIAGNOSIS — I50.42: ICD-10-CM

## 2023-09-16 DIAGNOSIS — N40.0: ICD-10-CM

## 2023-09-16 DIAGNOSIS — Z79.01: ICD-10-CM

## 2023-09-16 DIAGNOSIS — F41.9: ICD-10-CM

## 2023-09-16 DIAGNOSIS — I11.0: ICD-10-CM

## 2023-09-16 DIAGNOSIS — R31.0: ICD-10-CM

## 2023-09-16 DIAGNOSIS — Z79.899: ICD-10-CM

## 2023-09-16 DIAGNOSIS — D62: ICD-10-CM

## 2023-09-16 DIAGNOSIS — L89.156: ICD-10-CM

## 2023-09-16 DIAGNOSIS — N20.2: Primary | ICD-10-CM

## 2023-09-16 DIAGNOSIS — Z88.8: ICD-10-CM

## 2023-09-16 DIAGNOSIS — E78.5: ICD-10-CM

## 2023-09-16 DIAGNOSIS — L89.224: ICD-10-CM

## 2023-09-16 DIAGNOSIS — B95.62: ICD-10-CM

## 2023-09-16 DIAGNOSIS — D50.9: ICD-10-CM

## 2023-09-16 DIAGNOSIS — I48.91: ICD-10-CM

## 2023-09-16 DIAGNOSIS — G62.9: ICD-10-CM

## 2023-09-16 DIAGNOSIS — R62.7: ICD-10-CM

## 2023-09-16 DIAGNOSIS — N39.0: ICD-10-CM

## 2023-09-16 LAB
ALBUMIN SERPL BCP-MCNC: 3.3 G/DL (ref 3.4–5)
ALP SERPL-CCNC: 126 U/L (ref 46–116)
ALT SERPL W P-5'-P-CCNC: 68 U/L (ref 12–78)
APPEARANCE UR: (no result)
APTT PPP: 31.3 SEC (ref 24.3–34.3)
AST SERPL W P-5'-P-CCNC: 38 U/L (ref 15–37)
BASOPHILS # BLD AUTO: 0.1 K/UL (ref 0–0.2)
BASOPHILS NFR BLD AUTO: 0.5 % (ref 0–2)
BILIRUB DIRECT SERPL-MCNC: 0.2 MG/DL (ref 0–0.2)
BILIRUB SERPL-MCNC: 0.9 MG/DL (ref 0.2–1)
BILIRUB UR QL STRIP: NEGATIVE
BUN SERPL-MCNC: 15 MG/DL (ref 7–18)
CALCIUM SERPL-MCNC: 8.5 MG/DL (ref 8.5–10.1)
CHLORIDE SERPL-SCNC: 106 MMOL/L (ref 98–107)
CO2 SERPL-SCNC: 30 MMOL/L (ref 21–32)
COLOR UR: (no result)
CREAT SERPL-MCNC: 0.8 MG/DL (ref 0.6–1.3)
EOSINOPHIL # BLD AUTO: 0.1 K/UL (ref 0–0.7)
EOSINOPHIL NFR BLD AUTO: 0.5 % (ref 0–6)
ERYTHROCYTE [DISTWIDTH] IN BLOOD BY AUTOMATED COUNT: 19 % (ref 11.5–15)
GLUCOSE SERPL-MCNC: 116 MG/DL (ref 74–106)
GLUCOSE UR STRIP-MCNC: NEGATIVE MG/DL
HCT VFR BLD AUTO: 41 % (ref 39–51)
HGB BLD-MCNC: 13.1 G/DL (ref 13.5–17.5)
HGB UR QL STRIP: (no result) ERY/UL
INR PPP: 1.19 (ref 0.91–1.1)
KETONES UR STRIP-MCNC: NEGATIVE MG/DL
LEUKOCYTE ESTERASE UR QL STRIP: (no result)
LIPASE SERPL-CCNC: 39 U/L (ref 73–393)
LYMPHOCYTES NFR BLD AUTO: 0.4 K/UL (ref 0.8–4.8)
LYMPHOCYTES NFR BLD AUTO: 3.7 % (ref 20–44)
MCH RBC QN AUTO: 26 PG (ref 26–33)
MCHC RBC AUTO-ENTMCNC: 32 G/DL (ref 31–36)
MCV RBC AUTO: 81 FL (ref 80–96)
MONOCYTES NFR BLD AUTO: 0.3 K/UL (ref 0.1–1.3)
MONOCYTES NFR BLD AUTO: 3.3 % (ref 2–12)
NEUTROPHILS # BLD AUTO: 9.5 K/UL (ref 1.8–8.9)
NEUTROPHILS NFR BLD AUTO: 92 % (ref 43–81)
NITRITE UR QL STRIP: POSITIVE
PH UR STRIP: 7.5 [PH] (ref 5–8)
PLATELET # BLD AUTO: 189 K/UL (ref 150–450)
POTASSIUM SERPL-SCNC: 3.9 MMOL/L (ref 3.5–5.1)
PROT SERPL-MCNC: 7 G/DL (ref 6.4–8.2)
PROT UR QL STRIP: (no result) MG/DL
PROTHROMBIN TIME: 12.4 SECS (ref 9.2–11.1)
RBC # BLD AUTO: 5.05 MIL/UL (ref 4.5–6)
SODIUM SERPL-SCNC: 143 MMOL/L (ref 136–145)
SP GR UR STRIP: 1.02 (ref 1–1.03)
UROBILINOGEN UR STRIP-MCNC: 1 EU/DL
WBC NRBC COR # BLD AUTO: 10.3 K/UL (ref 4.3–11)

## 2023-09-16 PROCEDURE — A4223 INFUSION SUPPLIES W/O PUMP: HCPCS

## 2023-09-16 PROCEDURE — A6253 ABSORPT DRG > 48 SQ IN W/O B: HCPCS

## 2023-09-16 PROCEDURE — A6248 HYDROGEL DRSG GEL FILLER: HCPCS

## 2023-09-16 PROCEDURE — G0378 HOSPITAL OBSERVATION PER HR: HCPCS

## 2023-09-16 PROCEDURE — A6403 STERILE GAUZE>16 <= 48 SQ IN: HCPCS

## 2023-09-17 VITALS — OXYGEN SATURATION: 98 % | SYSTOLIC BLOOD PRESSURE: 144 MMHG | TEMPERATURE: 98.4 F | DIASTOLIC BLOOD PRESSURE: 79 MMHG

## 2023-09-17 VITALS — SYSTOLIC BLOOD PRESSURE: 111 MMHG | DIASTOLIC BLOOD PRESSURE: 69 MMHG | OXYGEN SATURATION: 96 % | TEMPERATURE: 99.1 F

## 2023-09-17 VITALS — TEMPERATURE: 97.7 F | SYSTOLIC BLOOD PRESSURE: 115 MMHG | OXYGEN SATURATION: 93 % | DIASTOLIC BLOOD PRESSURE: 74 MMHG

## 2023-09-17 VITALS — DIASTOLIC BLOOD PRESSURE: 74 MMHG | TEMPERATURE: 98 F | OXYGEN SATURATION: 98 % | SYSTOLIC BLOOD PRESSURE: 109 MMHG

## 2023-09-17 VITALS — OXYGEN SATURATION: 96 %

## 2023-09-17 VITALS — OXYGEN SATURATION: 98 %

## 2023-09-17 LAB
ALBUMIN SERPL BCP-MCNC: 2.7 G/DL (ref 3.4–5)
ALP SERPL-CCNC: 99 U/L (ref 46–116)
ALT SERPL W P-5'-P-CCNC: 45 U/L (ref 12–78)
AST SERPL W P-5'-P-CCNC: 22 U/L (ref 15–37)
BACTERIA UR CULT: YES
BASOPHILS # BLD AUTO: 0 K/UL (ref 0–0.2)
BASOPHILS NFR BLD AUTO: 0.5 % (ref 0–2)
BILIRUB SERPL-MCNC: 1.4 MG/DL (ref 0.2–1)
BUN SERPL-MCNC: 20 MG/DL (ref 7–18)
CALCIUM SERPL-MCNC: 8.3 MG/DL (ref 8.5–10.1)
CHLORIDE SERPL-SCNC: 105 MMOL/L (ref 98–107)
CHOLEST SERPL-MCNC: 95 MG/DL (ref ?–200)
CO2 SERPL-SCNC: 28 MMOL/L (ref 21–32)
CREAT SERPL-MCNC: 0.8 MG/DL (ref 0.6–1.3)
EOSINOPHIL # BLD AUTO: 0 K/UL (ref 0–0.7)
EOSINOPHIL NFR BLD AUTO: 0.1 % (ref 0–6)
ERYTHROCYTE [DISTWIDTH] IN BLOOD BY AUTOMATED COUNT: 18.8 % (ref 11.5–15)
GLUCOSE SERPL-MCNC: 155 MG/DL (ref 74–106)
HCT VFR BLD AUTO: 37 % (ref 39–51)
HDLC SERPL-MCNC: 40 MG/DL (ref 40–60)
HGB BLD-MCNC: 11.9 G/DL (ref 13.5–17.5)
IRON SERPL-MCNC: 27 UG/DL (ref 50–175)
LDLC SERPL DIRECT ASSAY-MCNC: 56 MG/DL (ref 0–99)
LYMPHOCYTES NFR BLD AUTO: 0.4 K/UL (ref 0.8–4.8)
LYMPHOCYTES NFR BLD AUTO: 5 % (ref 20–44)
MAGNESIUM SERPL-MCNC: 1.8 MG/DL (ref 1.8–2.4)
MCH RBC QN AUTO: 27 PG (ref 26–33)
MCHC RBC AUTO-ENTMCNC: 32 G/DL (ref 31–36)
MCV RBC AUTO: 83 FL (ref 80–96)
MONOCYTES NFR BLD AUTO: 0.3 K/UL (ref 0.1–1.3)
MONOCYTES NFR BLD AUTO: 3.4 % (ref 2–12)
NEUTROPHILS # BLD AUTO: 6.8 K/UL (ref 1.8–8.9)
NEUTROPHILS NFR BLD AUTO: 91 % (ref 43–81)
PHOSPHATE SERPL-MCNC: 2.9 MG/DL (ref 2.5–4.9)
PLATELET # BLD AUTO: 161 K/UL (ref 150–450)
POTASSIUM SERPL-SCNC: 3.6 MMOL/L (ref 3.5–5.1)
PROT SERPL-MCNC: 6 G/DL (ref 6.4–8.2)
RBC # BLD AUTO: 4.43 MIL/UL (ref 4.5–6)
RBC #/AREA URNS HPF: (no result) /HPF (ref 0–2)
SODIUM SERPL-SCNC: 138 MMOL/L (ref 136–145)
TIBC SERPL-MCNC: 228 UG/DL (ref 250–450)
TRIGL SERPL-MCNC: 39 MG/DL (ref 30–150)
TSH SERPL DL<=0.005 MIU/L-ACNC: 0.83 UIU/ML (ref 0.36–3.74)
WBC NRBC COR # BLD AUTO: 7.4 K/UL (ref 4.3–11)

## 2023-09-17 RX ADMIN — Medication SCH EACH: at 09:00

## 2023-09-17 RX ADMIN — Medication SCH TAB: at 08:25

## 2023-09-17 RX ADMIN — PANTOPRAZOLE SODIUM SCH MG: 40 TABLET, DELAYED RELEASE ORAL at 08:24

## 2023-09-17 RX ADMIN — TAMSULOSIN HYDROCHLORIDE SCH MG: 0.4 CAPSULE ORAL at 06:24

## 2023-09-17 RX ADMIN — GABAPENTIN SCH MG: 300 CAPSULE ORAL at 12:04

## 2023-09-17 RX ADMIN — DILTIAZEM HYDROCHLORIDE SCH MG: 120 CAPSULE, EXTENDED RELEASE ORAL at 08:26

## 2023-09-17 RX ADMIN — APIXABAN SCH MG: 5 TABLET, FILM COATED ORAL at 17:25

## 2023-09-17 RX ADMIN — GABAPENTIN SCH MG: 300 CAPSULE ORAL at 21:02

## 2023-09-17 RX ADMIN — Medication SCH MG: at 08:25

## 2023-09-17 RX ADMIN — ATORVASTATIN CALCIUM SCH MG: 40 TABLET, FILM COATED ORAL at 21:02

## 2023-09-17 RX ADMIN — TAMSULOSIN HYDROCHLORIDE SCH MG: 0.4 CAPSULE ORAL at 17:26

## 2023-09-17 RX ADMIN — Medication SCH GM: at 16:24

## 2023-09-17 RX ADMIN — TAMSULOSIN HYDROCHLORIDE SCH MG: 0.4 CAPSULE ORAL at 06:00

## 2023-09-17 RX ADMIN — FERROUS SULFATE TAB 325 MG (65 MG ELEMENTAL FE) SCH MG: 325 (65 FE) TAB at 17:24

## 2023-09-17 RX ADMIN — LACTOBACILLUS TAB SCH EACH: TAB at 08:25

## 2023-09-17 RX ADMIN — BUDESONIDE SCH MG: 0.5 SUSPENSION RESPIRATORY (INHALATION) at 10:12

## 2023-09-17 RX ADMIN — BUDESONIDE SCH MG: 0.5 SUSPENSION RESPIRATORY (INHALATION) at 20:08

## 2023-09-17 RX ADMIN — Medication SCH EACH: at 17:00

## 2023-09-17 RX ADMIN — FUROSEMIDE SCH MG: 40 TABLET ORAL at 08:26

## 2023-09-17 RX ADMIN — GABAPENTIN SCH MG: 300 CAPSULE ORAL at 08:25

## 2023-09-17 RX ADMIN — OXYCODONE HYDROCHLORIDE AND ACETAMINOPHEN SCH MG: 500 TABLET ORAL at 08:25

## 2023-09-17 RX ADMIN — GABAPENTIN SCH MG: 300 CAPSULE ORAL at 17:25

## 2023-09-17 RX ADMIN — MELOXICAM SCH MG: 7.5 TABLET ORAL at 08:26

## 2023-09-18 VITALS — SYSTOLIC BLOOD PRESSURE: 137 MMHG | OXYGEN SATURATION: 96 % | DIASTOLIC BLOOD PRESSURE: 83 MMHG | TEMPERATURE: 98.2 F

## 2023-09-18 VITALS — OXYGEN SATURATION: 96 %

## 2023-09-18 VITALS — OXYGEN SATURATION: 99 %

## 2023-09-18 VITALS — SYSTOLIC BLOOD PRESSURE: 111 MMHG | DIASTOLIC BLOOD PRESSURE: 71 MMHG | OXYGEN SATURATION: 97 % | TEMPERATURE: 98.5 F

## 2023-09-18 VITALS — OXYGEN SATURATION: 98 %

## 2023-09-18 VITALS — DIASTOLIC BLOOD PRESSURE: 57 MMHG | TEMPERATURE: 97.5 F | OXYGEN SATURATION: 96 % | SYSTOLIC BLOOD PRESSURE: 82 MMHG

## 2023-09-18 LAB
HCT VFR BLD AUTO: 40 % (ref 39–51)
HGB BLD-MCNC: 12.7 G/DL (ref 13.5–17.5)

## 2023-09-18 RX ADMIN — DILTIAZEM HYDROCHLORIDE SCH MG: 120 CAPSULE, EXTENDED RELEASE ORAL at 08:41

## 2023-09-18 RX ADMIN — APIXABAN SCH MG: 5 TABLET, FILM COATED ORAL at 08:53

## 2023-09-18 RX ADMIN — PANTOPRAZOLE SODIUM SCH MG: 40 TABLET, DELAYED RELEASE ORAL at 08:51

## 2023-09-18 RX ADMIN — GABAPENTIN SCH MG: 300 CAPSULE ORAL at 13:08

## 2023-09-18 RX ADMIN — FUROSEMIDE SCH MG: 40 TABLET ORAL at 08:47

## 2023-09-18 RX ADMIN — Medication SCH OZ: at 09:00

## 2023-09-18 RX ADMIN — TAMSULOSIN HYDROCHLORIDE SCH MG: 0.4 CAPSULE ORAL at 05:15

## 2023-09-18 RX ADMIN — GABAPENTIN SCH MG: 300 CAPSULE ORAL at 16:22

## 2023-09-18 RX ADMIN — BUDESONIDE SCH MG: 0.5 SUSPENSION RESPIRATORY (INHALATION) at 07:42

## 2023-09-18 RX ADMIN — FERROUS SULFATE TAB 325 MG (65 MG ELEMENTAL FE) SCH MG: 325 (65 FE) TAB at 08:47

## 2023-09-18 RX ADMIN — Medication PRN OZ: at 12:03

## 2023-09-18 RX ADMIN — Medication SCH GM: at 09:57

## 2023-09-18 RX ADMIN — METOPROLOL SUCCINATE SCH MG: 25 TABLET, EXTENDED RELEASE ORAL at 08:43

## 2023-09-18 RX ADMIN — LACTOBACILLUS TAB SCH EACH: TAB at 08:48

## 2023-09-18 RX ADMIN — BUDESONIDE SCH MG: 0.5 SUSPENSION RESPIRATORY (INHALATION) at 20:00

## 2023-09-18 RX ADMIN — OXYCODONE HYDROCHLORIDE AND ACETAMINOPHEN SCH MG: 500 TABLET ORAL at 08:47

## 2023-09-18 RX ADMIN — Medication SCH TAB: at 08:48

## 2023-09-18 RX ADMIN — METOPROLOL SUCCINATE SCH MG: 25 TABLET, EXTENDED RELEASE ORAL at 09:00

## 2023-09-18 RX ADMIN — MELOXICAM SCH MG: 7.5 TABLET ORAL at 08:41

## 2023-09-18 RX ADMIN — Medication SCH ML: at 08:27

## 2023-09-18 RX ADMIN — APIXABAN SCH MG: 5 TABLET, FILM COATED ORAL at 16:28

## 2023-09-18 RX ADMIN — ACETAMINOPHEN SCH MG: 325 TABLET ORAL at 08:47

## 2023-09-18 RX ADMIN — GABAPENTIN SCH MG: 300 CAPSULE ORAL at 21:42

## 2023-09-18 RX ADMIN — ATORVASTATIN CALCIUM SCH MG: 40 TABLET, FILM COATED ORAL at 22:00

## 2023-09-18 RX ADMIN — Medication SCH MG: at 08:47

## 2023-09-18 RX ADMIN — FERROUS SULFATE TAB 325 MG (65 MG ELEMENTAL FE) SCH MG: 325 (65 FE) TAB at 16:22

## 2023-09-18 RX ADMIN — Medication SCH OZ: at 12:05

## 2023-09-18 RX ADMIN — GABAPENTIN SCH MG: 300 CAPSULE ORAL at 08:42

## 2023-09-18 RX ADMIN — TAMSULOSIN HYDROCHLORIDE SCH MG: 0.4 CAPSULE ORAL at 17:50

## 2023-09-18 RX ADMIN — Medication SCH ML: at 17:49

## 2023-09-18 RX ADMIN — FUROSEMIDE SCH MG: 40 TABLET ORAL at 09:00

## 2023-09-19 VITALS — DIASTOLIC BLOOD PRESSURE: 83 MMHG | OXYGEN SATURATION: 99 % | TEMPERATURE: 97.3 F | SYSTOLIC BLOOD PRESSURE: 115 MMHG

## 2023-09-19 VITALS — OXYGEN SATURATION: 95 %

## 2023-09-19 VITALS — OXYGEN SATURATION: 100 %

## 2023-09-19 VITALS — DIASTOLIC BLOOD PRESSURE: 74 MMHG | SYSTOLIC BLOOD PRESSURE: 112 MMHG | TEMPERATURE: 97.7 F

## 2023-09-19 VITALS — OXYGEN SATURATION: 96 %

## 2023-09-19 VITALS — TEMPERATURE: 97.4 F | SYSTOLIC BLOOD PRESSURE: 106 MMHG | OXYGEN SATURATION: 96 % | DIASTOLIC BLOOD PRESSURE: 71 MMHG

## 2023-09-19 VITALS — OXYGEN SATURATION: 99 %

## 2023-09-19 LAB
BASOPHILS # BLD AUTO: 0 K/UL (ref 0–0.2)
BASOPHILS NFR BLD AUTO: 0.6 % (ref 0–2)
BUN SERPL-MCNC: 23 MG/DL (ref 7–18)
CALCIUM SERPL-MCNC: 8.5 MG/DL (ref 8.5–10.1)
CHLORIDE SERPL-SCNC: 104 MMOL/L (ref 98–107)
CO2 SERPL-SCNC: 29 MMOL/L (ref 21–32)
CREAT SERPL-MCNC: 0.8 MG/DL (ref 0.6–1.3)
EOSINOPHIL # BLD AUTO: 0.1 K/UL (ref 0–0.7)
EOSINOPHIL NFR BLD AUTO: 2.1 % (ref 0–6)
ERYTHROCYTE [DISTWIDTH] IN BLOOD BY AUTOMATED COUNT: 19.1 % (ref 11.5–15)
GLUCOSE SERPL-MCNC: 112 MG/DL (ref 74–106)
HCT VFR BLD AUTO: 37 % (ref 39–51)
HGB BLD-MCNC: 11.8 G/DL (ref 13.5–17.5)
LYMPHOCYTES NFR BLD AUTO: 0.8 K/UL (ref 0.8–4.8)
LYMPHOCYTES NFR BLD AUTO: 20.8 % (ref 20–44)
MAGNESIUM SERPL-MCNC: 2 MG/DL (ref 1.8–2.4)
MCH RBC QN AUTO: 27 PG (ref 26–33)
MCHC RBC AUTO-ENTMCNC: 32 G/DL (ref 31–36)
MCV RBC AUTO: 83 FL (ref 80–96)
MONOCYTES NFR BLD AUTO: 0.4 K/UL (ref 0.1–1.3)
MONOCYTES NFR BLD AUTO: 10.7 % (ref 2–12)
NEUTROPHILS # BLD AUTO: 2.6 K/UL (ref 1.8–8.9)
NEUTROPHILS NFR BLD AUTO: 65.8 % (ref 43–81)
PHOSPHATE SERPL-MCNC: 3.9 MG/DL (ref 2.5–4.9)
PLATELET # BLD AUTO: 169 K/UL (ref 150–450)
POTASSIUM SERPL-SCNC: 3.7 MMOL/L (ref 3.5–5.1)
RBC # BLD AUTO: 4.39 MIL/UL (ref 4.5–6)
SODIUM SERPL-SCNC: 141 MMOL/L (ref 136–145)
WBC NRBC COR # BLD AUTO: 3.9 K/UL (ref 4.3–11)

## 2023-09-19 RX ADMIN — APIXABAN SCH MG: 5 TABLET, FILM COATED ORAL at 16:32

## 2023-09-19 RX ADMIN — MELOXICAM SCH MG: 7.5 TABLET ORAL at 08:47

## 2023-09-19 RX ADMIN — Medication SCH OZ: at 09:38

## 2023-09-19 RX ADMIN — OXYCODONE HYDROCHLORIDE AND ACETAMINOPHEN SCH MG: 500 TABLET ORAL at 08:50

## 2023-09-19 RX ADMIN — ACETAMINOPHEN SCH MG: 325 TABLET ORAL at 08:50

## 2023-09-19 RX ADMIN — Medication SCH TAB: at 08:48

## 2023-09-19 RX ADMIN — GABAPENTIN SCH MG: 300 CAPSULE ORAL at 08:48

## 2023-09-19 RX ADMIN — Medication SCH MG: at 08:48

## 2023-09-19 RX ADMIN — BUDESONIDE SCH MG: 0.5 SUSPENSION RESPIRATORY (INHALATION) at 20:05

## 2023-09-19 RX ADMIN — GABAPENTIN SCH MG: 300 CAPSULE ORAL at 16:31

## 2023-09-19 RX ADMIN — LACTOBACILLUS TAB SCH EACH: TAB at 08:48

## 2023-09-19 RX ADMIN — PANTOPRAZOLE SODIUM SCH MG: 40 TABLET, DELAYED RELEASE ORAL at 08:56

## 2023-09-19 RX ADMIN — APIXABAN SCH MG: 5 TABLET, FILM COATED ORAL at 08:53

## 2023-09-19 RX ADMIN — FERROUS SULFATE TAB 325 MG (65 MG ELEMENTAL FE) SCH MG: 325 (65 FE) TAB at 16:33

## 2023-09-19 RX ADMIN — FERROUS SULFATE TAB 325 MG (65 MG ELEMENTAL FE) SCH MG: 325 (65 FE) TAB at 08:48

## 2023-09-19 RX ADMIN — BUDESONIDE SCH MG: 0.5 SUSPENSION RESPIRATORY (INHALATION) at 07:52

## 2023-09-19 RX ADMIN — DEXTROSE MONOHYDRATE SCH MLS/HR: 50 INJECTION, SOLUTION INTRAVENOUS at 12:12

## 2023-09-19 RX ADMIN — GABAPENTIN SCH MG: 300 CAPSULE ORAL at 20:31

## 2023-09-19 RX ADMIN — TAMSULOSIN HYDROCHLORIDE SCH MG: 0.4 CAPSULE ORAL at 06:23

## 2023-09-19 RX ADMIN — FUROSEMIDE SCH MG: 40 TABLET ORAL at 09:00

## 2023-09-19 RX ADMIN — Medication SCH ML: at 17:45

## 2023-09-19 RX ADMIN — Medication SCH GM: at 09:41

## 2023-09-19 RX ADMIN — TAMSULOSIN HYDROCHLORIDE SCH MG: 0.4 CAPSULE ORAL at 17:10

## 2023-09-19 RX ADMIN — Medication SCH ML: at 08:34

## 2023-09-19 RX ADMIN — Medication SCH OZ: at 10:43

## 2023-09-19 RX ADMIN — DILTIAZEM HYDROCHLORIDE SCH MG: 120 CAPSULE, EXTENDED RELEASE ORAL at 08:49

## 2023-09-19 RX ADMIN — GABAPENTIN SCH MG: 300 CAPSULE ORAL at 13:00

## 2023-09-19 RX ADMIN — METOPROLOL SUCCINATE SCH MG: 25 TABLET, EXTENDED RELEASE ORAL at 09:00

## 2023-09-20 VITALS — SYSTOLIC BLOOD PRESSURE: 123 MMHG | DIASTOLIC BLOOD PRESSURE: 79 MMHG

## 2023-09-20 VITALS — SYSTOLIC BLOOD PRESSURE: 123 MMHG | OXYGEN SATURATION: 97 % | TEMPERATURE: 98.1 F | DIASTOLIC BLOOD PRESSURE: 79 MMHG

## 2023-09-20 LAB
BASOPHILS # BLD AUTO: 0 K/UL (ref 0–0.2)
BASOPHILS NFR BLD AUTO: 0.5 % (ref 0–2)
BUN SERPL-MCNC: 24 MG/DL (ref 7–18)
CALCIUM SERPL-MCNC: 8.6 MG/DL (ref 8.5–10.1)
CHLORIDE SERPL-SCNC: 104 MMOL/L (ref 98–107)
CO2 SERPL-SCNC: 31 MMOL/L (ref 21–32)
CREAT SERPL-MCNC: 0.7 MG/DL (ref 0.6–1.3)
EOSINOPHIL # BLD AUTO: 0.1 K/UL (ref 0–0.7)
EOSINOPHIL NFR BLD AUTO: 2.3 % (ref 0–6)
ERYTHROCYTE [DISTWIDTH] IN BLOOD BY AUTOMATED COUNT: 19 % (ref 11.5–15)
GLUCOSE SERPL-MCNC: 99 MG/DL (ref 74–106)
HCT VFR BLD AUTO: 36 % (ref 39–51)
HGB BLD-MCNC: 11.8 G/DL (ref 13.5–17.5)
LYMPHOCYTES NFR BLD AUTO: 1 K/UL (ref 0.8–4.8)
LYMPHOCYTES NFR BLD AUTO: 20.9 % (ref 20–44)
MAGNESIUM SERPL-MCNC: 1.9 MG/DL (ref 1.8–2.4)
MCH RBC QN AUTO: 27 PG (ref 26–33)
MCHC RBC AUTO-ENTMCNC: 33 G/DL (ref 31–36)
MCV RBC AUTO: 82 FL (ref 80–96)
MONOCYTES NFR BLD AUTO: 0.4 K/UL (ref 0.1–1.3)
MONOCYTES NFR BLD AUTO: 8.6 % (ref 2–12)
NEUTROPHILS # BLD AUTO: 3.1 K/UL (ref 1.8–8.9)
NEUTROPHILS NFR BLD AUTO: 67.7 % (ref 43–81)
PHOSPHATE SERPL-MCNC: 3.7 MG/DL (ref 2.5–4.9)
PLATELET # BLD AUTO: 180 K/UL (ref 150–450)
POTASSIUM SERPL-SCNC: 3.8 MMOL/L (ref 3.5–5.1)
RBC # BLD AUTO: 4.39 MIL/UL (ref 4.5–6)
SODIUM SERPL-SCNC: 142 MMOL/L (ref 136–145)
WBC NRBC COR # BLD AUTO: 4.6 K/UL (ref 4.3–11)

## 2023-09-20 RX ADMIN — LACTOBACILLUS TAB SCH EACH: TAB at 08:42

## 2023-09-20 RX ADMIN — Medication PRN OZ: at 08:47

## 2023-09-20 RX ADMIN — Medication SCH OZ: at 08:49

## 2023-09-20 RX ADMIN — BUDESONIDE SCH MG: 0.5 SUSPENSION RESPIRATORY (INHALATION) at 07:30

## 2023-09-20 RX ADMIN — DEXTROSE MONOHYDRATE SCH MLS/HR: 50 INJECTION, SOLUTION INTRAVENOUS at 10:40

## 2023-09-20 RX ADMIN — PANTOPRAZOLE SODIUM SCH MG: 40 TABLET, DELAYED RELEASE ORAL at 07:51

## 2023-09-20 RX ADMIN — Medication SCH MG: at 08:43

## 2023-09-20 RX ADMIN — Medication SCH ML: at 08:21

## 2023-09-20 RX ADMIN — Medication SCH GM: at 08:47

## 2023-09-20 RX ADMIN — APIXABAN SCH MG: 5 TABLET, FILM COATED ORAL at 08:45

## 2023-09-20 RX ADMIN — Medication SCH OZ: at 08:46

## 2023-09-20 RX ADMIN — OXYCODONE HYDROCHLORIDE AND ACETAMINOPHEN SCH MG: 500 TABLET ORAL at 08:41

## 2023-09-20 RX ADMIN — GABAPENTIN SCH MG: 300 CAPSULE ORAL at 12:14

## 2023-09-20 RX ADMIN — TAMSULOSIN HYDROCHLORIDE SCH MG: 0.4 CAPSULE ORAL at 05:42

## 2023-09-20 RX ADMIN — FERROUS SULFATE TAB 325 MG (65 MG ELEMENTAL FE) SCH MG: 325 (65 FE) TAB at 08:43

## 2023-09-20 RX ADMIN — FUROSEMIDE SCH MG: 40 TABLET ORAL at 08:43

## 2023-09-20 RX ADMIN — MELOXICAM SCH MG: 7.5 TABLET ORAL at 08:43

## 2023-09-20 RX ADMIN — ACETAMINOPHEN SCH MG: 325 TABLET ORAL at 08:43

## 2023-09-20 RX ADMIN — GABAPENTIN SCH MG: 300 CAPSULE ORAL at 08:42

## 2023-09-20 RX ADMIN — Medication SCH TAB: at 08:41

## 2023-09-20 RX ADMIN — METOPROLOL SUCCINATE SCH MG: 25 TABLET, EXTENDED RELEASE ORAL at 08:40

## 2023-09-20 RX ADMIN — DILTIAZEM HYDROCHLORIDE SCH MG: 120 CAPSULE, EXTENDED RELEASE ORAL at 08:42
